# Patient Record
Sex: MALE | Race: WHITE | URBAN - METROPOLITAN AREA
[De-identification: names, ages, dates, MRNs, and addresses within clinical notes are randomized per-mention and may not be internally consistent; named-entity substitution may affect disease eponyms.]

---

## 2018-08-12 ENCOUNTER — EMERGENCY (EMERGENCY)
Facility: HOSPITAL | Age: 28
LOS: 0 days | Discharge: HOME | End: 2018-08-12
Attending: EMERGENCY MEDICINE | Admitting: EMERGENCY MEDICINE
Payer: COMMERCIAL

## 2018-08-12 VITALS
TEMPERATURE: 98 F | RESPIRATION RATE: 16 BRPM | SYSTOLIC BLOOD PRESSURE: 118 MMHG | HEART RATE: 70 BPM | DIASTOLIC BLOOD PRESSURE: 80 MMHG | OXYGEN SATURATION: 100 %

## 2018-08-12 VITALS — HEIGHT: 74 IN | WEIGHT: 149.91 LBS

## 2018-08-12 DIAGNOSIS — R51 HEADACHE: ICD-10-CM

## 2018-08-12 DIAGNOSIS — F07.81 POSTCONCUSSIONAL SYNDROME: ICD-10-CM

## 2018-08-12 DIAGNOSIS — G44.309 POST-TRAUMATIC HEADACHE, UNSPECIFIED, NOT INTRACTABLE: ICD-10-CM

## 2018-08-12 DIAGNOSIS — F17.210 NICOTINE DEPENDENCE, CIGARETTES, UNCOMPLICATED: ICD-10-CM

## 2018-08-12 PROCEDURE — 99283 EMERGENCY DEPT VISIT LOW MDM: CPT

## 2018-08-12 RX ORDER — ACETAMINOPHEN 500 MG
975 TABLET ORAL ONCE
Qty: 0 | Refills: 0 | Status: COMPLETED | OUTPATIENT
Start: 2018-08-12 | End: 2018-08-12

## 2018-08-12 RX ADMIN — Medication 975 MILLIGRAM(S): at 20:14

## 2018-08-12 NOTE — ED STATDOCS - ATTENDING CONTRIBUTION TO CARE
RORO Otero MD,  performed the initial face to face bedside interview with this patient regarding history of present illness, review of symptoms and relevant past medical, social and family history.  I completed an independent physical examination.  I was the initial provider who evaluated this patient. I have signed out the follow up of any pending tests (i.e. labs, radiological studies) to the ACP.  I have communicated the patient’s plan of care and disposition with the ACP.

## 2018-08-12 NOTE — ED STATDOCS - PHYSICAL EXAMINATION
Neurological: AOx3 NAD.  Cranial nerves I-XII intact.  Normal gross motor and sensory function. Pt ambulatory. 5/5 muscular strength in all four extremities with normal bulk and tone.  DTRs 2+ bilaterally.  No dysmetria or dysdiadochokinesia.  No focal neurological deficit.  No neck stiffness, photophobia, rash, or meningismus.
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
## 2018-08-12 NOTE — ED STATDOCS - NS_ ATTENDINGSCRIBEDETAILS _ED_A_ED_FT
The scribe's documentation has been prepared under my direction and personally reviewed by me in its entirety.  I confirm that the note above accurately reflects all my work, treatment, procedures, and decision making except where otherwise noted or amended by me.  Darrion Otero M.D.

## 2018-08-12 NOTE — ED ADULT NURSE NOTE - NSIMPLEMENTINTERV_GEN_ALL_ED
Implemented All Universal Safety Interventions:  [place] to call system. Call bell, personal items and telephone within reach. Instruct patient to call for assistance. Room bathroom lighting operational. Non-slip footwear when patient is off stretcher. Physically safe environment: no spills, clutter or unnecessary equipment. Stretcher in lowest position, wheels locked, appropriate side rails in place.

## 2018-08-13 ENCOUNTER — APPOINTMENT (OUTPATIENT)
Dept: NEUROSURGERY | Facility: CLINIC | Age: 28
End: 2018-08-13
Payer: COMMERCIAL

## 2018-08-13 VITALS
HEART RATE: 85 BPM | RESPIRATION RATE: 16 BRPM | BODY MASS INDEX: 18.1 KG/M2 | DIASTOLIC BLOOD PRESSURE: 79 MMHG | HEIGHT: 73.9 IN | SYSTOLIC BLOOD PRESSURE: 114 MMHG | WEIGHT: 141 LBS

## 2018-08-13 DIAGNOSIS — S09.90XA UNSPECIFIED INJURY OF HEAD, INITIAL ENCOUNTER: ICD-10-CM

## 2018-08-13 DIAGNOSIS — F07.81 POSTCONCUSSIONAL SYNDROME: ICD-10-CM

## 2018-08-13 PROBLEM — Z00.00 ENCOUNTER FOR PREVENTIVE HEALTH EXAMINATION: Status: ACTIVE | Noted: 2018-08-13

## 2018-08-13 PROCEDURE — 99203 OFFICE O/P NEW LOW 30 MIN: CPT

## 2018-08-14 ENCOUNTER — FORM ENCOUNTER (OUTPATIENT)
Age: 28
End: 2018-08-14

## 2018-08-15 ENCOUNTER — APPOINTMENT (OUTPATIENT)
Dept: MRI IMAGING | Facility: CLINIC | Age: 28
End: 2018-08-15
Payer: COMMERCIAL

## 2018-08-15 ENCOUNTER — OUTPATIENT (OUTPATIENT)
Dept: OUTPATIENT SERVICES | Facility: HOSPITAL | Age: 28
LOS: 1 days | End: 2018-08-15
Payer: COMMERCIAL

## 2018-08-15 DIAGNOSIS — Z00.8 ENCOUNTER FOR OTHER GENERAL EXAMINATION: ICD-10-CM

## 2018-08-15 PROCEDURE — 70551 MRI BRAIN STEM W/O DYE: CPT | Mod: 26

## 2018-08-15 PROCEDURE — 70551 MRI BRAIN STEM W/O DYE: CPT

## 2023-10-17 ENCOUNTER — OFFICE (OUTPATIENT)
Dept: URBAN - METROPOLITAN AREA CLINIC 102 | Facility: CLINIC | Age: 33
Setting detail: OPHTHALMOLOGY
End: 2023-10-17
Payer: COMMERCIAL

## 2023-10-17 DIAGNOSIS — H31.023: ICD-10-CM

## 2023-10-17 DIAGNOSIS — H52.7: ICD-10-CM

## 2023-10-17 PROCEDURE — 92015 DETERMINE REFRACTIVE STATE: CPT | Performed by: STUDENT IN AN ORGANIZED HEALTH CARE EDUCATION/TRAINING PROGRAM

## 2023-10-17 PROCEDURE — 92004 COMPRE OPH EXAM NEW PT 1/>: CPT | Performed by: STUDENT IN AN ORGANIZED HEALTH CARE EDUCATION/TRAINING PROGRAM

## 2023-10-17 ASSESSMENT — CONFRONTATIONAL VISUAL FIELD TEST (CVF)
OD_FINDINGS: FULL
OS_FINDINGS: FULL

## 2023-10-17 ASSESSMENT — TONOMETRY: OS_IOP_MMHG: 21

## 2023-10-19 PROBLEM — H52.7 REFRACTIVE ERROR ; BOTH EYES: Status: ACTIVE | Noted: 2023-10-17

## 2023-10-19 ASSESSMENT — AXIALLENGTH_DERIVED
OD_AL: 24.3769
OS_AL: 24.8509
OS_AL: 24.5309

## 2023-10-19 ASSESSMENT — REFRACTION_AUTOREFRACTION
OS_AXIS: 179
OS_SPHERE: -1.25
OS_CYLINDER: -0.25
OD_CYLINDER: -0.25
OD_AXIS: 151
OD_SPHERE: -1.00

## 2023-10-19 ASSESSMENT — REFRACTION_MANIFEST
OD_CYLINDER: SPH
OD_AXIS: 000
OD_VA1: 20/20-
OS_SPHERE: -2.00
OS_VA1: 20/30-
OS_AXIS: 179
OD_SPHERE: -1.25
OS_CYLINDER: -0.25

## 2023-10-19 ASSESSMENT — SPHEQUIV_DERIVED
OD_SPHEQUIV: -1.125
OS_SPHEQUIV: -1.375
OS_SPHEQUIV: -2.125

## 2023-10-19 ASSESSMENT — VISUAL ACUITY
OD_BCVA: 20/250
OS_BCVA: 20/70-

## 2023-10-19 ASSESSMENT — KERATOMETRY
OS_AXISANGLE_DEGREES: 090
OD_K2POWER_DIOPTERS: 42.75
OS_K1POWER_DIOPTERS: 42.50
OS_K2POWER_DIOPTERS: 42.50
OD_AXISANGLE_DEGREES: 043
OD_K1POWER_DIOPTERS: 42.50

## 2025-02-18 ENCOUNTER — EMERGENCY (EMERGENCY)
Facility: HOSPITAL | Age: 35
LOS: 0 days | Discharge: HOME | End: 2025-02-18
Attending: STUDENT IN AN ORGANIZED HEALTH CARE EDUCATION/TRAINING PROGRAM

## 2025-02-18 VITALS
DIASTOLIC BLOOD PRESSURE: 69 MMHG | HEART RATE: 118 BPM | OXYGEN SATURATION: 98 % | TEMPERATURE: 98 F | RESPIRATION RATE: 18 BRPM | SYSTOLIC BLOOD PRESSURE: 108 MMHG

## 2025-02-18 VITALS — WEIGHT: 160.06 LBS | HEIGHT: 74 IN

## 2025-02-18 PROCEDURE — 99283 EMERGENCY DEPT VISIT LOW MDM: CPT

## 2025-02-18 PROCEDURE — 99282 EMERGENCY DEPT VISIT SF MDM: CPT
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
## 2025-02-18 NOTE — ED STATDOCS - PATIENT PORTAL LINK FT
You can access the FollowMyHealth Patient Portal offered by [facility] by registering at the following website: [email or web address]. By joining [facility]’s FollowMyHealth portal, you will also be able to view your health information using other applications (apps) compatible with our system.
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
## 2025-02-18 NOTE — ED STATDOCS - NSFOLLOWUPINSTRUCTIONS_ED_ALL_ED_FT
Post Concussion Syndrome    AMBULATORY CARE:    Post-concussion syndrome (PCS) is a group of symptoms that affect your body, thinking, and behavior. PCS develops 10 to 14 days after a concussion and can last for weeks to years.    Common signs and symptoms of PCS:    Headaches or problems with your vision    Dizziness or poor balance    Forgetfulness or problems concentrating    Problems with sleep    Changes in your personality    Seizures    Depression or anxiety  Have someone call your local emergency number (911 in the [place]) if:    You have a seizure.    You have trouble breathing.    You are not responding, or you cannot be woken.  Seek care immediately if:    You have a sudden headache that seems different or much worse than your usual headaches.    You cannot stop vomiting.    You have sudden changes in your vision, or your pupils are different sizes.  Call your doctor if:    You feel depressed.    You have nausea or are vomiting.    You have trouble concentrating.    You have trouble speaking or thinking.    Your symptoms get worse.    You have questions or concerns about your condition or care.  Treatment will focus on your symptoms. You may need any of the following:    Acetaminophen decreases pain and fever. It is available without a doctor's order. Ask how much to take and how often to take it. Follow directions. Read the labels of all other medicines you are using to see if they also contain acetaminophen, or ask your doctor or pharmacist. Acetaminophen can cause liver damage if not taken correctly.    NSAIDs help decrease swelling and pain or fever. This medicine is available with or without a doctor's order. NSAIDs can cause stomach bleeding or kidney problems in certain people. If you take blood thinner medicine, always ask your healthcare provider if NSAIDs are safe for you. Always read the medicine label and follow directions.    Antidepressants may be given for depression or sleep problems.    Migraine medicines may be given for migraine headaches.  Prevent PCS:    Follow your treatment plan after a concussion to help you heal. You will heal more quickly if you follow your healthcare provider's instructions.    Make your home safe. Home safety measures can help prevent head injuries that could lead to a concussion. Install handrails for every staircase. Put soft bumpers on furniture edges and corners. Secure furniture, such as dressers and bookcases so they do not fall over.  Fall Prevention for Adults      Always wear a seatbelt in the car. A seatbelt helps decrease your risk for a head injury if you are in a car accident.    Wear protective sports equipment that fits properly. Helmets help decrease your risk for a serious brain injury. Talk to your provider about other ways that you can decrease your risk for a concussion if you play sports. Ask for more information about sports concussions.  Manage your symptoms:    Rest from physical and mental activities as directed. Mental activities need you to think, concentrate, and pay attention. Rest will help you recover from your concussion. Ask your healthcare provider when you can return to school and other daily activities.    Go to therapy as directed. A cognitive behavioral therapist teaches you skills to help with any thinking and behavior problems you may have. An occupational therapist teaches you skills to help with daily activities.    Do not participate in sports or physical activities until your provider says it is okay. These activities could make your symptoms worse or lead to another concussion. Your provider will tell you when it is okay to return to sports or physical activities.  Follow up with your doctor or specialist as directed: Your doctor may refer you to a psychiatrist, a neurologist, or a substance abuse counselor. Write down your questions so you remember to ask them during your visits.    For more information:    [employer]  [address] [place] [address]  Phone: 1-757.255.3726  Phone: 1-978.575.5348  Web Address: http://www.[employer]    Motor Vehicle Collision Injury  ImageIt is common to have injuries to your face, arms, and body after a car accident (motor vehicle collision). These injuries may include:    Cuts.  Burns.  Bruises.  Sore muscles.    These injuries tend to feel worse for the first 24–48 hours. You may feel the stiffest and sorest over the first several hours. You may also feel worse when you wake up the first morning after your accident. After that, you will usually begin to get better with each day. How quickly you get better often depends on:    How bad the accident was.  How many injuries you have.  Where your injuries are.  What types of injuries you have.  If your airbag was used.    Follow these instructions at home:  Medicines     Take and apply over-the-counter and prescription medicines only as told by your doctor.  If you were prescribed antibiotic medicine, take or apply it as told by your doctor. Do not stop using the antibiotic even if your condition gets better.  If You Have a Wound or a Burn:     Clean your wound or burn as told by your doctor.    Wash it with mild soap and water.  Rinse it with water to get all the soap off.  Pat it dry with a clean towel. Do not rub it.    Follow instructions from your doctor about how to take care of your wound or burn. Make sure you:    Wash your hands with soap and water before you change your bandage (dressing). If you cannot use soap and water, use hand .  Change your bandage as told by your doctor.  Leave stitches (sutures), skin glue, or skin tape (adhesive) strips in place, if you have these. They may need to stay in place for 2 weeks or longer. If tape strips get loose and curl up, you may trim the loose edges. Do not remove tape strips completely unless your doctor says it is okay.    Do not scratch or pick at the wound or burn.  Do not break any blisters you may have. Do not peel any skin.  Avoid getting sun on your wound or burn.  Raise (elevate) the wound or burn above the level of your heart while you are sitting or lying down. If you have a wound or burn on your face, you may want to sleep with your head raised. You may do this by putting an extra pillow under your head.  Check your wound or burn every day for signs of infection. Watch for:    Redness, swelling, or pain.  Fluid, blood, or pus.  Warmth.  A bad smell.    General instructions     If directed, put ice on your eyes, face, trunk (torso), or other injured areas.    Put ice in a plastic bag.  Place a towel between your skin and the bag.  Leave the ice on for 20 minutes, 2–3 times a day.    Drink enough fluid to keep your urine clear or pale yellow.  Do not drink alcohol.  Ask your doctor if you have any limits to what you can lift.  Rest. Rest helps your body to heal. Make sure you:    Get plenty of sleep at night. Avoid staying up late at night.  Go to bed at the same time on weekends and weekdays.    Ask your doctor when you can drive, ride a bicycle, or use heavy machinery. Do not do these activities if you are dizzy.  Contact a doctor if:  Your symptoms get worse.  You have any of the following symptoms for more than two weeks after your car accident:    Lasting (chronic) headaches.  Dizziness or balance problems.  Feeling sick to your stomach (nausea).  Vision problems.  More sensitivity to noise or light.  Depression or mood swings.  Feeling worried or nervous (anxiety).  Getting upset or bothered easily.  Memory problems.  Trouble concentrating or paying attention.  Sleep problems.  Feeling tired all the time.    Get help right away if:  You have:    Numbness, tingling, or weakness in your arms or legs.  Very bad neck pain, especially tenderness in the middle of the back of your neck.  A change in your ability to control your pee (urine) or poop (stool).  More pain in any area of your body.  Shortness of breath or light-headedness.  Chest pain.  Blood in your pee, poop, or throw-up (vomit).  Very bad pain in your belly (abdomen) or your back.  Very bad headaches or headaches that are getting worse.  Sudden vision loss or double vision.    Your eye suddenly turns red.  The black center of your eye (pupil) is an odd shape or size.  This information is not intended to replace advice given to you by your health care provider. Make sure you discuss any questions you have with your health care provider.      .
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
## 2025-02-18 NOTE — ED STATDOCS - ATTENDING APP SHARED VISIT CONTRIBUTION OF CARE
I, Padilla Venegas, DO personally saw the patient with BARBARA.  I have personally performed a face to face diagnostic evaluation on this patient.  I have reviewed the BARBARA note and agree with the history, exam, and plan of care, except as noted.  I personally saw the patient and performed a substantive portion of the visit including all aspects of the medical decision making.